# Patient Record
Sex: FEMALE | Race: WHITE | Employment: PART TIME | URBAN - METROPOLITAN AREA
[De-identification: names, ages, dates, MRNs, and addresses within clinical notes are randomized per-mention and may not be internally consistent; named-entity substitution may affect disease eponyms.]

---

## 2019-03-22 ENCOUNTER — HOSPITAL ENCOUNTER (OUTPATIENT)
Age: 49
Discharge: HOME | End: 2019-03-22
Payer: COMMERCIAL

## 2019-03-22 ENCOUNTER — HOSPITAL ENCOUNTER (OUTPATIENT)
Dept: GENERAL RADIOLOGY | Age: 49
Discharge: HOME | End: 2019-03-22
Payer: COMMERCIAL

## 2019-03-22 DIAGNOSIS — M54.6 PAIN IN THORACIC SPINE: ICD-10-CM

## 2019-03-22 LAB
ALBUMIN SERPL-MCNC: 4.6 GM/DL (ref 3.4–5)
ALP BLD-CCNC: 83 IU/L (ref 40–129)
ALT SERPL-CCNC: 22 U/L (ref 10–40)
ANION GAP SERPL CALCULATED.3IONS-SCNC: 13 MMOL/L (ref 4–16)
AST SERPL-CCNC: 15 IU/L (ref 15–37)
BACTERIA: ABNORMAL /HPF
BASOPHILS ABSOLUTE: 0.1 K/CU MM
BASOPHILS RELATIVE PERCENT: 0.4 % (ref 0–1)
BILIRUB SERPL-MCNC: 0.2 MG/DL (ref 0–1)
BILIRUBIN DIRECT: 0.2 MG/DL (ref 0–0.3)
BILIRUBIN URINE: NEGATIVE MG/DL
BILIRUBIN, INDIRECT: 0 MG/DL (ref 0–0.7)
BLOOD, URINE: ABNORMAL
BUN BLDV-MCNC: 9 MG/DL (ref 6–23)
CALCIUM SERPL-MCNC: 9.2 MG/DL (ref 8.3–10.6)
CHLORIDE BLD-SCNC: 103 MMOL/L (ref 99–110)
CHOLESTEROL: 328 MG/DL
CLARITY: CLEAR
CO2: 21 MMOL/L (ref 21–32)
COLOR: YELLOW
CREAT SERPL-MCNC: 0.7 MG/DL (ref 0.6–1.1)
DIFFERENTIAL TYPE: ABNORMAL
EOSINOPHILS ABSOLUTE: 0.3 K/CU MM
EOSINOPHILS RELATIVE PERCENT: 2.2 % (ref 0–3)
ERYTHROCYTE SEDIMENTATION RATE: 16 MM/HR (ref 0–20)
ESTIMATED AVERAGE GLUCOSE: 100 MG/DL
GFR AFRICAN AMERICAN: >60 ML/MIN/1.73M2
GFR NON-AFRICAN AMERICAN: >60 ML/MIN/1.73M2
GLUCOSE BLD-MCNC: 83 MG/DL (ref 70–99)
GLUCOSE, URINE: NEGATIVE MG/DL
HBA1C MFR BLD: 5.1 % (ref 4.2–6.3)
HCT VFR BLD CALC: 48 % (ref 37–47)
HDLC SERPL-MCNC: 29 MG/DL
HEMOGLOBIN: 15.1 GM/DL (ref 12.5–16)
IMMATURE NEUTROPHIL %: 0.3 % (ref 0–0.43)
KETONES, URINE: NEGATIVE MG/DL
LDL CHOLESTEROL DIRECT: 213 MG/DL
LEUKOCYTE ESTERASE, URINE: NEGATIVE
LYMPHOCYTES ABSOLUTE: 4.5 K/CU MM
LYMPHOCYTES RELATIVE PERCENT: 32.7 % (ref 24–44)
MAGNESIUM: 2.1 MG/DL (ref 1.8–2.4)
MCH RBC QN AUTO: 29 PG (ref 27–31)
MCHC RBC AUTO-ENTMCNC: 31.5 % (ref 32–36)
MCV RBC AUTO: 92.3 FL (ref 78–100)
MONOCYTES ABSOLUTE: 0.8 K/CU MM
MONOCYTES RELATIVE PERCENT: 5.4 % (ref 0–4)
MUCUS: ABNORMAL HPF
NITRITE URINE, QUANTITATIVE: NEGATIVE
NUCLEATED RBC %: 0 %
PDW BLD-RTO: 13.5 % (ref 11.7–14.9)
PH, URINE: 5 (ref 5–8)
PLATELET # BLD: 462 K/CU MM (ref 140–440)
PMV BLD AUTO: 9.5 FL (ref 7.5–11.1)
POTASSIUM SERPL-SCNC: 4.7 MMOL/L (ref 3.5–5.1)
PROTEIN UA: NEGATIVE MG/DL
RBC # BLD: 5.2 M/CU MM (ref 4.2–5.4)
RBC URINE: 1 /HPF (ref 0–6)
SEGMENTED NEUTROPHILS ABSOLUTE COUNT: 8.2 K/CU MM
SEGMENTED NEUTROPHILS RELATIVE PERCENT: 59 % (ref 36–66)
SODIUM BLD-SCNC: 137 MMOL/L (ref 135–145)
SPECIFIC GRAVITY UA: 1.02 (ref 1–1.03)
SQUAMOUS EPITHELIAL: 5 /HPF
T4 FREE: 1.08 NG/DL (ref 0.9–1.8)
TOTAL IMMATURE NEUTOROPHIL: 0.04 K/CU MM
TOTAL NUCLEATED RBC: 0 K/CU MM
TOTAL PROTEIN: 7.2 GM/DL (ref 6.4–8.2)
TRICHOMONAS: ABNORMAL /HPF
TRIGL SERPL-MCNC: 737 MG/DL
TSH HIGH SENSITIVITY: 1.62 UIU/ML (ref 0.27–4.2)
URIC ACID: 5.1 MG/DL (ref 2.6–6)
UROBILINOGEN, URINE: NORMAL MG/DL (ref 0.2–1)
VITAMIN D 25-HYDROXY: 13.05 NG/ML
WBC # BLD: 13.8 K/CU MM (ref 4–10.5)
WBC UA: 1 /HPF (ref 0–5)

## 2019-03-22 PROCEDURE — 36415 COLL VENOUS BLD VENIPUNCTURE: CPT

## 2019-03-22 PROCEDURE — 85652 RBC SED RATE AUTOMATED: CPT

## 2019-03-22 PROCEDURE — 84443 ASSAY THYROID STIM HORMONE: CPT

## 2019-03-22 PROCEDURE — 85025 COMPLETE CBC W/AUTO DIFF WBC: CPT

## 2019-03-22 PROCEDURE — 80053 COMPREHEN METABOLIC PANEL: CPT

## 2019-03-22 PROCEDURE — 82306 VITAMIN D 25 HYDROXY: CPT

## 2019-03-22 PROCEDURE — 83721 ASSAY OF BLOOD LIPOPROTEIN: CPT

## 2019-03-22 PROCEDURE — 80061 LIPID PANEL: CPT

## 2019-03-22 PROCEDURE — 82248 BILIRUBIN DIRECT: CPT

## 2019-03-22 PROCEDURE — 83036 HEMOGLOBIN GLYCOSYLATED A1C: CPT

## 2019-03-22 PROCEDURE — 72070 X-RAY EXAM THORAC SPINE 2VWS: CPT

## 2019-03-22 PROCEDURE — 84439 ASSAY OF FREE THYROXINE: CPT

## 2019-03-22 PROCEDURE — 72050 X-RAY EXAM NECK SPINE 4/5VWS: CPT

## 2019-03-22 PROCEDURE — 84550 ASSAY OF BLOOD/URIC ACID: CPT

## 2019-03-22 PROCEDURE — 83735 ASSAY OF MAGNESIUM: CPT

## 2019-03-22 PROCEDURE — 81001 URINALYSIS AUTO W/SCOPE: CPT

## 2020-02-04 ENCOUNTER — HOSPITAL ENCOUNTER (OUTPATIENT)
Dept: PHYSICAL THERAPY | Age: 50
Setting detail: THERAPIES SERIES
Discharge: HOME | End: 2020-02-04
Payer: COMMERCIAL

## 2020-02-04 PROCEDURE — 97110 THERAPEUTIC EXERCISES: CPT

## 2020-02-04 PROCEDURE — 97162 PT EVAL MOD COMPLEX 30 MIN: CPT

## 2020-02-04 PROCEDURE — G0283 ELEC STIM OTHER THAN WOUND: HCPCS

## 2020-02-04 PROCEDURE — 97140 MANUAL THERAPY 1/> REGIONS: CPT

## 2020-02-04 ASSESSMENT — PAIN DESCRIPTION - LOCATION: LOCATION: ARM;HAND;NECK

## 2020-02-04 ASSESSMENT — PAIN DESCRIPTION - PROGRESSION: CLINICAL_PROGRESSION: GRADUALLY WORSENING

## 2020-02-04 ASSESSMENT — PAIN DESCRIPTION - PAIN TYPE: TYPE: CHRONIC PAIN

## 2020-02-04 ASSESSMENT — PAIN - FUNCTIONAL ASSESSMENT: PAIN_FUNCTIONAL_ASSESSMENT: PREVENTS OR INTERFERES WITH MANY ACTIVE NOT PASSIVE ACTIVITIES

## 2020-02-04 ASSESSMENT — PAIN DESCRIPTION - ORIENTATION: ORIENTATION: RIGHT

## 2020-02-04 ASSESSMENT — PAIN SCALES - GENERAL: PAINLEVEL_OUTOF10: 1

## 2020-02-04 ASSESSMENT — PAIN DESCRIPTION - FREQUENCY: FREQUENCY: INTERMITTENT

## 2020-02-04 ASSESSMENT — PAIN DESCRIPTION - DESCRIPTORS: DESCRIPTORS: ACHING;SHARP

## 2020-02-04 ASSESSMENT — PAIN DESCRIPTION - ONSET: ONSET: PROGRESSIVE

## 2020-02-04 NOTE — FLOWSHEET NOTE
tissue irritability and pain and improve tolerance to usual activity. Pt has had gradually worsening pain for last 1-2 years especiallly, driving more painful in that time. Subjective:  See eval         Any changes in Ambulatory Summary Sheet? None        Objective:  See eval           Exercises: (No more than 4 columns)   Exercise/Equipment 2/4/20 Date Date           WARM UP            MANUAL:  As below x                    TABLE      Thor rot stretch  5x5-10\" ea, bent elbow prn     Hold head off pillow in S/L 3X5\" ea side      cerv retract  Next      DNF  Seated 3x3\"      Prone scap squeeze Painful R UE                          STANDING      scap squeezes back to wall and arms down  10x, cues for no shrug                                                                            MODALITIES IFC                     Other Therapeutic Activities/Education:  Patient received education on their current pathology and how their condition effects them with their functional activities. Patient understood discussion and questions were answered. Patient understands their activity limitations and understands rational for treatment progression. Home Exercise Program:  Issued, practiced and pt demo ability to perform 2/4/2020         Manual Treatments:  Prone Thor mobs to sis and eval cerv retractions/mobs      Modalities:  IFC to C/T spine w/ pt seated and cervical HP on R and L draped over T spine. 13'        Communication with other providers:  POC set for cosign 2/4/20      Assessment: Assessment: Pt is a 50 yo female who presents w/ neck and upper back pain and R hand symptoms. She demonstrates decreased endurance of cervical and thoracic muscles w/ altered alignment and soft tissue restrictions in thoracic spine especially. She has high tissue irritability R >L thoracid region and decreased tolerance to supine position.   These limitations are affecting her ability to perform her usual daily activity and she will benefit from PT to help restore alignment, decrease soft tissue irritability and pain and improve tolerance to usual activity. Pt has had gradually worsening pain for last 1-2 years especiallly, driving more painful in that time.        Plan for Next Session:   trial some manual cervical traction, work on thoracic mobility to tolerance, soft tissue prn, check on HEP and adjust prn, advance strength work slowly and work on endurance of cerv and thor regions, pain control prn      Time In / Time Out: 1005/1120            Timed Code/Total Treatment Minutes:   20/76   1 man 10', 1 TE 15', 1 Estim 15', 1 eval 35      Next Progress Note due:  Visit 10      Plan of Care Interventions:  [x] Therapeutic Exercise  [x] Modalities:  [x] Therapeutic Activity     [] Ultrasound  [x] Estim  [] Gait Training      [] Cervical Traction [] Lumbar Traction  [x] Neuromuscular Re-education    [x] Cold/hotpack [] Iontophoresis   [x] Instruction in HEP      [] Vasopneumatic   [] Dry Needling    [x] Manual Therapy               [] Aquatic Therapy              Electronically signed by:  Clarita Milian, PT, MPT, ATC  2/4/2020, 11:41 AM    2/4/2020, 11:42 AM

## 2020-02-04 NOTE — PLAN OF CARE
Outpatient Physical Therapy           [place]           [x] Phone: [phone]   Fax: [phone]  [place]           [] Phone: [phone]   Fax: [phone]     To: Referring Practitioner: Graciella Libman    From: Clarita Milian PT     Patient: Rae Lyons       : 1970  Diagnosis: Diagnosis: Cervical thoracic pain   Treatment Diagnosis: Treatment Diagnosis: neck and thoracic pain, weakness, stiffness   Date: 2020    Physical Therapy Certification/Re-Certification Form  Dear Dr. Graciella Libman,   The following patient has been evaluated for physical therapy services and for therapy to continue, insurance requires physician review of the treatment plan initially and every 90 days. Please review the attached evaluation and/or summary of the patient's plan of care, and verify that you agree therapy should continue by signing the attached document and sending it back to our office. Assessment:    Assessment: Pt is a 52 yo female who presents w/ neck and upper back pain and R hand symptoms. She demonstrates decreased endurance of cervical and thoracic muscles w/ altered alignment and soft tissue restrictions in thoracic spine especially. She has high tissue irritability R >L thoracid region and decreased tolerance to supine position. These limitations are affecting her ability to perform her usual daily activity and she will benefit from PT to help restore alignment, decrease soft tissue irritability and pain and improve tolerance to usual activity. Pt has had gradually worsening pain for last 1-2 years especiallly, driving more painful in that time. Patient agrees with established plan of care and assisted in the development of their short term and long term goals. Patient had no adverse reaction with initial treatment and there are no barriers to learning. Demonstrates no mental or cognitive disorder.       Plan of Care/Treatment to date:  [x] Therapeutic Exercise  [x] Modalities:  [x]

## 2020-02-04 NOTE — PROGRESS NOTES
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 Observation: min rounded shoulders, in supine lower ribs seem rotated to L, in prone T spine rotated w/ TP's noted elevated on R    Spine  Cervical: rot R 70, L 68, flex chin to chest pulls in Tspine, ext 58, SB R 35, L 40 ---mild pain w/ rot adn SB to L    Strength RUE  Strength RUE: WFL  Strength LUE  Strength LUE: WFL     Additional Measures  Special Tests: Neg cerv compression/distraction and Spurling. Segmental mobility testinng min hypomobility t/o C and T spine more pain T spine, holding head off pillow 15\" increases symptoms, Cerv retraction supine no change symptoms   Sensation  Overall Sensation Status: WNL        Assessment   Conditions Requiring Skilled Therapeutic Intervention  Body structures, Functions, Activity limitations: Decreased functional mobility ; Decreased ADL status; Decreased ROM; Decreased strength; Increased pain;Decreased high-level IADLs;Decreased posture  Assessment: Pt is a 52 yo female who presents w/ neck and upper back pain and R hand symptoms. She demonstrates decreased endurance of cervical and thoracic muscles w/ altered alignment and soft tissue restrictions in thoracic spine especially. She has high tissue irritability R >L thoracid region and decreased tolerance to supine position. These limitations are affecting her ability to perform her usual daily activity and she will benefit from PT to help restore alignment, decrease soft tissue irritability and pain and improve tolerance to usual activity. Pt has had gradually worsening pain for last 1-2 years especiallly, driving more painful in that time. Patient agrees with established plan of care and assisted in the development of their short term and long term goals.  Patient had no

## 2020-02-06 ENCOUNTER — HOSPITAL ENCOUNTER (OUTPATIENT)
Dept: PHYSICAL THERAPY | Age: 50
Setting detail: THERAPIES SERIES
Discharge: HOME | End: 2020-02-06
Payer: COMMERCIAL

## 2020-02-06 PROCEDURE — 97112 NEUROMUSCULAR REEDUCATION: CPT

## 2020-02-06 PROCEDURE — G0283 ELEC STIM OTHER THAN WOUND: HCPCS

## 2020-02-06 PROCEDURE — 97140 MANUAL THERAPY 1/> REGIONS: CPT

## 2020-02-06 NOTE — FLOWSHEET NOTE
Outpatient Physical Therapy  [place]           [x] Phone: [phone]   Fax: [phone]  [place]           [] Phone: [phone]   Fax: [phone]        Physical Therapy Daily Treatment Note  Date:  2020    Patient Name:  Jg Dunn    :  1970  MRN: [id number]  Restrictions/Precautions: Other position/activity restrictions: none  Diagnosis:   Diagnosis: Cervical thoracic pain  Date of Injury/Surgery:   Treatment Diagnosis: Treatment Diagnosis: neck and thoracic pain, weakness, stiffness    Insurance/Certification information: [facility]   Referring Physician:  Referring Practitioner: Mila López  Next Doctor Visit:    Plan of care signed (Y/N):  Y  Outcome Measure: NDI 16%  Visit# / total visits:    POC  Pain level: 6/10 R UT pain       Goals:       Short term goals  Time Frame for Short term goals: 3 weeks, 20  Short term goal 1: Pt will be able to report at least 25% reduction in pain   Short term goal 2: Pt will be able to tolerate slowly advancing Cerv and thor ROM and strengthening   Short term goal 3: Pt will be compliant w/ HEP   Long term goals  Time Frame for Long term goals : 6 weeks, 3/13/20  Long term goal 1: Pt will be independent w/ HEP and able to continue to manage her pain on her own after PT  Long term goal 2: Pt will be able to report pain < 5/10 at least 50% of the time  Long term goal 3: Pt will be able to drive w/ min pain    Summary of Evaluation: Assessment: Pt is a 52 yo female who presents w/ neck and upper back pain and R hand symptoms. She demonstrates decreased endurance of cervical and thoracic muscles w/ altered alignment and soft tissue restrictions in thoracic spine especially. She has high tissue irritability R >L thoracid region and decreased tolerance to supine position.   These limitations are affecting her ability to perform her usual daily activity and she will benefit from PT to help restore alignment, decrease soft tissue irritability and pain and improve tolerance to usual activity. Pt has had gradually worsening pain for last 1-2 years especiallly, driving more painful in that time. Subjective:  Yoel Ivy arrives to therapy stating that the neck is sore and stiff this morning, cracking a lot. UT muscle is painful on the R clear up into the base of her skull. Between the shoulder blades feels like a shocking feeling. Had a headache after last session and was sore, felt like she had been worked. Any changes in Ambulatory Summary Sheet? None        Objective: Forward head, rounded shoulders, kyphotic thoracic spine. Multiple cues for relaxed shoulder posture. Heavy use of R UT with scapular retraction with elbows bent. Large TP in R UT. 3 cavitations with rotational mobilizations this date. Exercises: (No more than 4 columns)   Exercise/Equipment 2/4/20 2/6/2020 Date           WARM UP            MANUAL:  As below x x                   TABLE      Thor rot stretch  5x5-10\" ea, bent elbow prn 5*5\" ea     Hold head off pillow in S/L 3X5\" ea side  3*5\" ea     cerv retract  Next  10*3\"    DNF  Seated 3x3\"  Seated 10*3\"    Prone scap squeeze Painful R UE -                         STANDING      scap squeezes back to wall and arms down  10x, cues for no shrug 10* ea                                                                            MODALITIES IFC IFC, MHP                    Other Therapeutic Activities/Education: none         Home Exercise Program:  Issued, practiced and pt demo ability to perform 2/4/2020         Manual Treatments:  Prone thoracic mobilizations P-A with SP and TVP contact, rotational mobilizations as well. STM/TPR to R UT muscle and manual stretching here as well. Modalities:  IFC to C/T spine w/ pt seated and cervical HP on R and L draped over T spine.   13'        Communication with other providers:  POC set for cosign 2/4/20      Assessment:  Yoel Ivy tolerated today's

## 2020-02-14 ENCOUNTER — HOSPITAL ENCOUNTER (OUTPATIENT)
Dept: PHYSICAL THERAPY | Age: 50
Setting detail: THERAPIES SERIES
Discharge: HOME | End: 2020-02-14
Payer: COMMERCIAL

## 2020-02-14 PROCEDURE — 97140 MANUAL THERAPY 1/> REGIONS: CPT

## 2020-02-14 PROCEDURE — 97112 NEUROMUSCULAR REEDUCATION: CPT

## 2020-02-14 PROCEDURE — G0283 ELEC STIM OTHER THAN WOUND: HCPCS

## 2020-02-14 NOTE — FLOWSHEET NOTE
Outpatient Physical Therapy  [place]           [x] Phone: [phone]   Fax: [phone]  [place]           [] Phone: [phone]   Fax: [phone]        Physical Therapy Daily Treatment Note  Date:  2020    Patient Name:  Kimberly Sharma    :  1970  MRN: [id number]  Restrictions/Precautions: Other position/activity restrictions: none  Diagnosis:   Diagnosis: Cervical thoracic pain  Date of Injury/Surgery:   Treatment Diagnosis: Treatment Diagnosis: neck and thoracic pain, weakness, stiffness    Insurance/Certification information: PT Insurance Information: [facility]   Referring Physician:  Referring Practitioner: Angel Hi Doctor Visit:    Plan of care signed (Y/N):  Y  Outcome Measure: NDI 16%  Visit# / total visits:   3/12 POC  Pain level: 1/10        Goals:       Short term goals  Time Frame for Short term goals: 3 weeks, 20  Short term goal 1: Pt will be able to report at least 25% reduction in pain   Short term goal 2: Pt will be able to tolerate slowly advancing Cerv and thor ROM and strengthening   Short term goal 3: Pt will be compliant w/ HEP   Long term goals  Time Frame for Long term goals : 6 weeks, 3/13/20  Long term goal 1: Pt will be independent w/ HEP and able to continue to manage her pain on her own after PT  Long term goal 2: Pt will be able to report pain < 5/10 at least 50% of the time  Long term goal 3: Pt will be able to drive w/ min pain    Summary of Evaluation: Assessment: Pt is a 52 yo female who presents w/ neck and upper back pain and R hand symptoms. She demonstrates decreased endurance of cervical and thoracic muscles w/ altered alignment and soft tissue restrictions in thoracic spine especially. She has high tissue irritability R >L thoracid region and decreased tolerance to supine position.   These limitations are affecting her ability to perform her usual daily activity and she will benefit from PT to help restore alignment, decrease soft tissue irritability and pain and improve tolerance to usual activity. Pt has had gradually worsening pain for last 1-2 years especiallly, driving more painful in that time. Subjective:  Pt stated that her pain today was 1/10,but patient stated that she took muscle relaxer last night. Pt denied any headache today. Pt stated that she was having more pain in her low back today. Any changes in Ambulatory Summary Sheet? None        Objective:  Noted multiple TPs in scalenes, UTs, and cervical paraspinals. Pt had difficulty laying supine today due to report of increased low back pain. Pt also initially very guarded with cervical ROM. Exercises: (No more than 4 columns)   Exercise/Equipment 2/4/20 2/6/2020 2/14/2020           WARM UP            MANUAL:  As below x x x                  TABLE      Thor rot stretch  5x5-10\" ea, bent elbow prn 5*5\" ea  5x 5\"    Hold head off pillow in S/L 3X5\" ea side  3*5\" ea  5x 5\" ea    cerv retract  Next  10*3\" 10x2   DNF  Seated 3x3\"  Seated 10*3\" Supine x 5   Prone scap squeeze Painful R UE -                         STANDING      scap squeezes back to wall and arms down  10x, cues for no shrug 10* ea  --                                                                          MODALITIES IFC IFC, MHP IFC, HP                   Other Therapeutic Activities/Education: none         Home Exercise Program:  Issued, practiced and pt demo ability to perform 2/4/2020         Manual Treatments: rotational mobilizations  STM/TPR to B UT, scalenes, and cervical paraspinals muscle and manual stretching and PROM in all directions x 25'      Modalities:  IFC to C/T spine w/ pt seated and cervical HP on R and L draped over T spine. 13'        Communication with other providers:  POC set for cosign 2/4/20      Assessment:  Pt tolerated treatment fairly well. Pt was able to add supine DNF. Pt needed cues to hold the chin tuck and perform the DNF.  Pt rated pain at 1/10 ,but stated that she still felt like the electrodes were on her back.      End session pain: 1/10      Plan for Next Session:   trial some manual cervical traction, work on thoracic mobility to tolerance, soft tissue prn, check on HEP and adjust prn, advance strength work slowly and work on endurance of cerv and thor regions, pain control prn          Time In / Time Out: 8258/0368        Timed Code/Total Treatment Minutes:   45'/60' 1 NR 20' 2 man 25' 1 estim 15'       Next Progress Note due:  Visit 10      Plan of Care Interventions:  [x] Therapeutic Exercise  [x] Modalities:  [x] Therapeutic Activity     [] Ultrasound  [x] Estim  [] Gait Training      [] Cervical Traction [] Lumbar Traction  [x] Neuromuscular Re-education    [x] Cold/hotpack [] Iontophoresis   [x] Instruction in HEP      [] Vasopneumatic   [] Dry Needling    [x] Manual Therapy               [] Aquatic Therapy              Electronically signed by:  David Todd PTA  2/14/2020, 8:41 AM        2/14/2020,11:13 AM

## 2020-02-19 ENCOUNTER — HOSPITAL ENCOUNTER (OUTPATIENT)
Dept: PHYSICAL THERAPY | Age: 50
Setting detail: THERAPIES SERIES
Discharge: HOME | End: 2020-02-19
Payer: COMMERCIAL

## 2020-02-19 PROCEDURE — 97110 THERAPEUTIC EXERCISES: CPT

## 2020-02-19 PROCEDURE — 97140 MANUAL THERAPY 1/> REGIONS: CPT

## 2020-02-19 PROCEDURE — G0283 ELEC STIM OTHER THAN WOUND: HCPCS

## 2020-02-19 NOTE — FLOWSHEET NOTE
Outpatient Physical Therapy  [place]           [x] Phone: [phone]   Fax: [phone]  [place]           [] Phone: [phone]   Fax: [phone]        Physical Therapy Daily Treatment Note  Date:  2020    Patient Name:  Christina Prather    :  1970  MRN: [id number]  Restrictions/Precautions: Other position/activity restrictions: none  Diagnosis:   Diagnosis: Cervical thoracic pain  Date of Injury/Surgery:   Treatment Diagnosis: Treatment Diagnosis: neck and thoracic pain, weakness, stiffness    Insurance/Certification information: PT [facility]   Referring Physician:  Referring Practitioner: Jian Ash  Next Doctor Visit:    Plan of care signed (Y/N):  Y  Outcome Measure: NDI 16%  Visit# / total visits:    POC  Pain level: 1/10        Goals:       Short term goals  Time Frame for Short term goals: 3 weeks, 20  Short term goal 1: Pt will be able to report at least 25% reduction in pain   Short term goal 2: Pt will be able to tolerate slowly advancing Cerv and thor ROM and strengthening   Short term goal 3: Pt will be compliant w/ HEP   Long term goals  Time Frame for Long term goals : 6 weeks, 3/13/20  Long term goal 1: Pt will be independent w/ HEP and able to continue to manage her pain on her own after PT  Long term goal 2: Pt will be able to report pain < 5/10 at least 50% of the time  Long term goal 3: Pt will be able to drive w/ min pain    Summary of Evaluation: Assessment: Pt is a 52 yo female who presents w/ neck and upper back pain and R hand symptoms. She demonstrates decreased endurance of cervical and thoracic muscles w/ altered alignment and soft tissue restrictions in thoracic spine especially. She has high tissue irritability R >L thoracid region and decreased tolerance to supine position.   These limitations are affecting her ability to perform her usual daily activity and she will benefit from PT to help restore alignment, decrease soft tissue irritability and pain and improve tolerance to usual activity. Pt has had gradually worsening pain for last 1-2 years especiallly, driving more painful in that time. Subjective:  Pt stated she is doing pretty good today. Any changes in Ambulatory Summary Sheet? None        Objective:  Significant TP's in the B UT/medial scap borders        Exercises: (No more than 4 columns)   Exercise/Equipment 2/4/20 2/6/2020 2/14/2020 2/19/20 #4            WARM UP              MANUAL:  As below x x x                     TABLE       Thor rot stretch  5x5-10\" ea, bent elbow prn 5*5\" ea  5x 5\"     Hold head off pillow in S/L 3X5\" ea side  3*5\" ea  5x 5\" ea     cerv retract  Next  10*3\" 10x2    DNF  Seated 3x3\"  Seated 10*3\" Supine x 5 Supine x 5 w/ 3 ct   Prone scap squeeze Painful R UE -                             STANDING       scap squeezes back to wall and arms down  10x, cues for no shrug 10* ea  -- 5 ct x 10   TRX walkouts    5 ct x 10                                                                              MODALITIES IFC IFC, MHP IFC, HP IFC to the B UT/medial scap borders with HP's over both shldrs x 15 min. See below                     Other Therapeutic Activities/Education: none         Home Exercise Program:  Issued, practiced and pt demo ability to perform 2/4/2020         Manual Treatments:  STM/TPR to B UT, scalenes, and cervical paraspinals muscle and manual stretching and PROM in all directions x 25'      Modalities:  IFC to C/T spine w/ pt seated and cervical HP on R and L draped over T spine.   13'        Communication with other providers:  POC set for cosign 2/4/20      Assessment:  Pain post treatment 2/10      Plan for Next Session:   trial some manual cervical traction, work on thoracic mobility to tolerance, soft tissue prn, check on HEP and adjust prn, advance strength work slowly and work on endurance of cerv and thor regions, pain control prn          Time In / Time Out: 987-542        Timed Code/Total Treatment Minutes:   22' TE, 25' manual,15' ES/ 58'      Next Progress Note due:  Visit 10      Plan of Care Interventions:  [x] Therapeutic Exercise  [x] Modalities:  [x] Therapeutic Activity     [] Ultrasound  [x] Estim  [] Gait Training      [] Cervical Traction [] Lumbar Traction  [x] Neuromuscular Re-education    [x] Cold/hotpack [] Iontophoresis   [x] Instruction in HEP      [] Vasopneumatic   [] Dry Needling    [x] Manual Therapy               [] Aquatic Therapy              Electronically signed by:  Radhika Caal PTA  2/19/2020, 8:47 AM

## 2020-02-21 ENCOUNTER — HOSPITAL ENCOUNTER (OUTPATIENT)
Dept: PHYSICAL THERAPY | Age: 50
Setting detail: THERAPIES SERIES
Discharge: HOME | End: 2020-02-21
Payer: COMMERCIAL

## 2020-02-21 PROCEDURE — G0283 ELEC STIM OTHER THAN WOUND: HCPCS

## 2020-02-21 PROCEDURE — 97140 MANUAL THERAPY 1/> REGIONS: CPT

## 2020-02-21 PROCEDURE — 97110 THERAPEUTIC EXERCISES: CPT

## 2020-02-25 ENCOUNTER — HOSPITAL ENCOUNTER (OUTPATIENT)
Dept: PHYSICAL THERAPY | Age: 50
Setting detail: THERAPIES SERIES
Discharge: HOME | End: 2020-02-25
Payer: COMMERCIAL

## 2020-02-25 PROCEDURE — 97140 MANUAL THERAPY 1/> REGIONS: CPT

## 2020-02-25 PROCEDURE — 97110 THERAPEUTIC EXERCISES: CPT

## 2020-02-25 PROCEDURE — G0283 ELEC STIM OTHER THAN WOUND: HCPCS

## 2020-02-25 NOTE — FLOWSHEET NOTE
tissue irritability and pain and improve tolerance to usual activity. Pt has had gradually worsening pain for last 1-2 years especiallly, driving more painful in that time. Subjective:  Pt states she has had a rough time with her mother in law this weekend. Any changes in Ambulatory Summary Sheet? None        Objective:  Significant TP in the medial L scap border. Both UT have TP's. Cervical ROM seems to be improving with more ROM noted with the manual stretching. Exercises: (No more than 4 columns)   Exercise/Equipment 2/14/2020 2/19/20 #4 2/21/20 #5 2/25/20 #6            WARM UP   UBE 3'/3' UBE 3'/3'          MANUAL:  As below x                       TABLE       Thor rot stretch  5x 5\"       Hold head off pillow in S/L 5x 5\" ea       cerv retract  10x2      DNF  Supine x 5 Supine x 5 w/ 3 ct     Prone scap squeeze                               STANDING       scap squeezes back to wall and arms down  -- 5 ct x 10     TRX walkouts  5 ct x 10 5 ct x 10 5 ct x 15   TB row   3 ct 2 x 10 RTB 3 ct 3 x 10 RTB                                                                       MODALITIES IFC, HP IFC to the B UT/medial scap borders with HP's over both shldrs x 15 min. See below IFC to the B UT/medial scap borders with HP's over both shldrs x 15 min. See below IFC to the B UT/medial scap borders with HP's over both shldrs x 15 min. See below                     Other Therapeutic Activities/Education: none         Home Exercise Program:  Issued, practiced and pt demo ability to perform 2/4/2020         Manual Treatments:  STM/TPR to B UT, scalenes, and cervical paraspinals muscle and manual stretching of rotation, SB and levator x 30'      Modalities:  IFC to C/T spine w/ pt seated and cervical HP on R and L draped over T spine. 13'        Communication with other providers:  POC set for cosign 2/4/20      Assessment:  Pain post ES/HP 2/10.     Plan for Next Session:   trial some manual cervical

## 2020-02-28 ENCOUNTER — HOSPITAL ENCOUNTER (OUTPATIENT)
Dept: PHYSICAL THERAPY | Age: 50
Setting detail: THERAPIES SERIES
Discharge: HOME | End: 2020-02-28
Payer: COMMERCIAL

## 2020-02-28 PROCEDURE — 97112 NEUROMUSCULAR REEDUCATION: CPT

## 2020-02-28 PROCEDURE — 97110 THERAPEUTIC EXERCISES: CPT

## 2020-02-28 PROCEDURE — G0283 ELEC STIM OTHER THAN WOUND: HCPCS

## 2020-02-28 PROCEDURE — 97140 MANUAL THERAPY 1/> REGIONS: CPT

## 2020-02-28 NOTE — FLOWSHEET NOTE
help restore alignment, decrease soft tissue irritability and pain and improve tolerance to usual activity. Pt has had gradually worsening pain for last 1-2 years especiallly, driving more painful in that time. Subjective:   Pt feels like PT is helping, overall feeling better. Max pain in last 2 weeks <5/10. Pt still feels weak also. Manual work is helping too. Any changes in Ambulatory Summary Sheet? None        Objective:   Pt has a very hard time holding posture upright and adding arm movements, pain in scap region. Noted core weakness as well. Pt very TTP L > R thoracic region w/ significant hypomobility of T spine. Min change in any symptoms w/ cervical traction. Please see plan for next session below, thanks!! Exercises: (No more than 4 columns)   Exercise/Equipment 2/21/20 #5 2/25/20 #6 2/28/20  #7             WARM UP UBE 3'/3' UBE 3'/3' 100 rpm, 2' ea            MANUAL:  As below   x                     TABLE       Thor rot stretch    5x10\" ea     cerv retract    -    DNF    standing    Supine on half roll, shoulder flex   10x ea UE, hold 2-3\"    Prone scap squeezes    10x3\"                     STANDING       scap squeezes - row  arms down    RTB 10x2   RTB 10x2    TRX walkouts 5 ct x 10 5 ct x 15 -    TB row 3 ct 2 x 10 RTB 3 ct 3 x 10 RTB -    Shoulder flex w/ back to wall, hold towel behind head   - Too painful, did just good posture, flex to 90 10x    Deep neck flex at wall, towel behind head   10x3\"    Wall push up   10x2                                                   MODALITIES IFC to the B UT/medial scap borders with HP's over both shldrs x 15 min. See below IFC to the B UT/medial scap borders with HP's over both shldrs x 15 min.   See below IFC as below                      Other Therapeutic Activities/Education: none         Home Exercise Program:  Issued, practiced and pt demo ability to perform 2/4/2020         Manual Treatments:   Manual cervical traction 30x60\",

## 2020-03-03 ENCOUNTER — HOSPITAL ENCOUNTER (OUTPATIENT)
Dept: PHYSICAL THERAPY | Age: 50
Setting detail: THERAPIES SERIES
Discharge: HOME | End: 2020-03-03
Payer: COMMERCIAL

## 2020-03-03 PROCEDURE — 97140 MANUAL THERAPY 1/> REGIONS: CPT

## 2020-03-03 PROCEDURE — 97530 THERAPEUTIC ACTIVITIES: CPT

## 2020-03-03 PROCEDURE — G0283 ELEC STIM OTHER THAN WOUND: HCPCS

## 2020-03-03 PROCEDURE — 97110 THERAPEUTIC EXERCISES: CPT

## 2020-03-03 NOTE — FLOWSHEET NOTE
Outpatient Physical Therapy  [place]           [x] Phone: [phone]   Fax: [phone]  [place]           [] Phone: [phone]   Fax: [phone]        Physical Therapy Daily Treatment Note  Date:  3/3/2020    Patient Name:  Miranda Hobbs    :  1970  MRN: [id number]  Restrictions/Precautions: Other position/activity restrictions: none  Diagnosis:   Diagnosis: Cervical thoracic pain  Date of Injury/Surgery:   Treatment Diagnosis: Treatment Diagnosis: neck and thoracic pain, weakness, stiffness    Insurance/Certification information: PT Insurance Information: [facility]   Referring Physician:  Referring Practitioner: Helen Aguirre  Next Doctor Visit:    Plan of care signed (Y/N):  Y  Outcome Measure: NDI 16%  Visit# / total visits:    POC  Pain level: 2-3/10        Goals:       Short term goals  Time Frame for Short term goals: 3 weeks, 20  Short term goal 1: Pt will be able to report at least 25% reduction in pain   Met   Short term goal 2: Pt will be able to tolerate slowly advancing Cerv and thor ROM and strengthening Partially met  Short term goal 3: Pt will be compliant w/ HEP   Partially met    Long term goals  Time Frame for Long term goals : 6 weeks, 3/13/20  Long term goal 1: Pt will be independent w/ HEP and able to continue to manage her pain on her own after PT  Long term goal 2: Pt will be able to report pain < 5/10 at least 50% of the time  Long term goal 3: Pt will be able to drive w/ min pain    Summary of Evaluation: Assessment: Pt is a 52 yo female who presents w/ neck and upper back pain and R hand symptoms. She demonstrates decreased endurance of cervical and thoracic muscles w/ altered alignment and soft tissue restrictions in thoracic spine especially. She has high tissue irritability R >L thoracid region and decreased tolerance to supine position.   These limitations are affecting her ability to perform her usual daily activity and she will benefit from PT to ability to perform 2/4/2020         Manual Treatments:   Manual cervical traction and UT/ levator stretching 30x60\",  Prone thor mobs P/A , very gentle d/t pain, Correction of the R T 5-6 depressed rib with pressure to the L ,total time 15'      Modalities:  IFC to C/T spine w/ pt seated and cervical CP on R and L draped over T spine.   13'        Communication with other providers:  POC set for cosign 2/4/20      Assessment:  Reduction of pain to 1/10 post treatment    Plan for Next Session:   Assess response to manual cervical traction, work on thoracic mobility to tolerance, soft tissue prn, advance strength work slowly and work on endurance of cerv and thor regions, pain control prn      Time In / Time Out:  850- 1000         Timed Code/Total Treatment Minutes:   13' manual, 15' ES,15' TE, 25' TE/70'              Next Progress Note due:  Visit 10--ST do on 3/6      Plan of Care Interventions:  [x] Therapeutic Exercise  [x] Modalities:  [x] Therapeutic Activity     [] Ultrasound  [x] Estim  [] Gait Training      [] Cervical Traction [] Lumbar Traction  [x] Neuromuscular Re-education    [x] Cold/hotpack [] Iontophoresis   [x] Instruction in HEP      [] Vasopneumatic   [] Dry Needling    [x] Manual Therapy               [] Aquatic Therapy              Electronically signed by:  Shweta Shirley PT, MPT, ATC 3/3/2020, 8:54 AM

## 2020-03-06 ENCOUNTER — HOSPITAL ENCOUNTER (OUTPATIENT)
Dept: PHYSICAL THERAPY | Age: 50
Setting detail: THERAPIES SERIES
Discharge: HOME | End: 2020-03-06
Payer: COMMERCIAL

## 2020-03-06 PROCEDURE — 97140 MANUAL THERAPY 1/> REGIONS: CPT

## 2020-03-06 PROCEDURE — 97110 THERAPEUTIC EXERCISES: CPT

## 2020-03-06 PROCEDURE — 97112 NEUROMUSCULAR REEDUCATION: CPT

## 2020-03-06 PROCEDURE — G0283 ELEC STIM OTHER THAN WOUND: HCPCS

## 2020-03-06 NOTE — FLOWSHEET NOTE
Outpatient Physical Therapy  [place]           [x] Phone: [phone]   Fax: [phone]  [place]           [] Phone: [phone]   Fax: [phone]        Physical Therapy Daily Treatment Note  Date:  3/6/2020    Patient Name:  Elli Pederson    :  1970  MRN: [id number]  Restrictions/Precautions: Other position/activity restrictions: none  Diagnosis:   Diagnosis: Cervical thoracic pain  Date of Injury/Surgery:   Treatment Diagnosis: Treatment Diagnosis: neck and thoracic pain, weakness, stiffness    Insurance/Certification information: PT Insurance Information: [employer]   Referring Physician:  Referring Practitioner: Michelle Soto  Next Doctor Visit:    Plan of care signed (Y/N):  Y, pending cosign PN for 8 more  Outcome Measure: NDI 16%, 3/6: 30%   Visit# / total visits:    POC  Pain level: 2/10        Goals:       Short term goals  Extend all goals to 4/3/20  Time Frame for Short term goals: 3 weeks, 20  Short term goal 1: Pt will be able to report at least 25% reduction in pain   Met   Short term goal 2: Pt will be able to tolerate slowly advancing Cerv and thor ROM and strengthening Mostly met  Short term goal 3: Pt will be compliant w/ HEP   Partially met--hard to find time to do them     Long term goals  Time Frame for Long term goals : 6 weeks, 3/13/20  Long term goal 1: Pt will be independent w/ HEP and able to continue to manage her pain on her own after PT   Partially met  Long term goal 2: Pt will be able to report pain < 5/10 at least 50% of the time  Met 75% of time  Long term goal 3: Pt will be able to drive w/ min pain  Partially met, still some pain  NDI 16%, 3/6: 30%     Summary of Evaluation: Assessment: Pt is a 52 yo female who presents w/ neck and upper back pain and R hand symptoms. She demonstrates decreased endurance of cervical and thoracic muscles w/ altered alignment and soft tissue restrictions in thoracic spine especially.   She has high tissue row  arms down  RTB 10x2   RTB 10x2 RTB 10x2   RTB 10x2 both with 3 ct hold RTB 10x2   RTB 10x2    TRX walkouts -  -    Shoulder flex w/ back to wall, hold towel behind head  Too painful, did just good posture, flex to 90 10x  10x2    Deep neck flex at wall, towel behind head 10x3\" Next visit 15x2\"    Wall push up 10x2 Next visit 10x2, counter                                                    MODALITIES IFC as below IFC as below IFC                       Other Therapeutic Activities/Education: 3/3/20:  Education on transfer training of her mother in law. Worked on rolling a person in bed and sit to stands from chair with guarding her own back and using proper body mechanics x 15 min      Home Exercise Program:  Issued, practiced and pt demo ability to perform 2/4/2020         Manual Treatments:   Manual cervical traction and 3x60\", cervical mobs and PROM to help R SB,  Prone thor mobs P/A and rotational to sis,   total time 15'      Modalities:  IFC to C/T spine w/ pt seated and cervical CP on R and L draped over T spine. 13'        Communication with other providers:  POC set for cosign 2/4/20, see PN 3/6/20      Assessment:  Judith Oliver has been seen for 9 sessions of PT now focusing on ROM, soft tissue mobility, thoracic and cervical mobility, pain reduction and light strengthening. She is making some good progress towards goals but continues w/ some pain and weakness and would benefit from continued PT to help improve strength and mechanics to control pain w/ usual activity.  Reduction of pain to 1/10 post treatment      Plan for Next Session:   continue manual cervical traction prn, continue work on thoracic mobility to tolerance, soft tissue prn, advance strength work slowly and work on endurance of cerv and thor regions, pain control prn      Time In / Time Out:  0915/1020         Timed Code/Total Treatment Minutes:   50/65   1 Man 15', 1 neuro 10', 1 TE 25', 1 estim 15'            Next Progress Note due: see

## 2020-03-06 NOTE — PROGRESS NOTES
Outpatient Physical Therapy           [place]           [x] Phone: [phone]   Fax: [phone]  [place] [place]           [] Phone: [phone]   Fax: [phone]      To: Sumeet       From: Kehinde Turner, PT     Patient: Juliana Sue                  : 1970  Diagnosis: Cervical thoracic pain    Date: 3/6/2020  Treatment Diagnosis: neck and thoracic pain, weakness, stiffness      [x]  Progress Note                []  Discharge Note    Evaluation Date:  2020  Total Visits to date:   9 Cancels/No-shows to date:  0    Subjective:  Arms are hurting more today d/t changing some lifting mech of MIL at home and also just trying to steady her when she's standing. Has hired some help too for [employer]. Pt feels like she is having less pain between shoulder blades, maybe about 50% towards her end goals, including less pain w/ usual activity. Max pain is still 8/10 w/ activity at home. Plan of Care/Treatment to date:  [x] Therapeutic Exercise    [x] Modalities:  [x] Therapeutic Activity     [] Ultrasound  [x] Electrical Stimulation  [] Gait Training      [] Cervical Traction   [] Lumbar Traction  [x] Neuromuscular Re-education  [x] Cold/hotpack [] Iontophoresis  [x] Instruction in HEP      Other:  [x] Manual Therapy       []  Vasopneumatic  [] Aquatic Therapy       []                          Objective/Significant Findings At Last Visit/Comments:  Posture has improved some but pt still w/ protracted scap and rounded T spine. UT compensation still noted also but improving from eval as well. Full shoulder AROM seated w/o pain, Cervical rot R 70. L 78, ext 65 w/ just mild T spine pain, flex WNL, SB R 20 w/o pain, L 40. Pt has improved mobility of thoracic segments w/ less pain but still some trigger points on R scap and along spine. Pt has a C5 not side gliding well today but corrects to manual work.       Assessment:   Fallon Bates has been seen for 9 sessions of PT now focusing on ROM, soft any questions or concerns, please don't hesitate to call.   Thank you for your referral.    Physician Signature:______________________ Date:______ Time: ________  By signing above, therapists plan is approved by physician

## 2020-03-10 ENCOUNTER — HOSPITAL ENCOUNTER (OUTPATIENT)
Dept: PHYSICAL THERAPY | Age: 50
Setting detail: THERAPIES SERIES
Discharge: HOME | End: 2020-03-10
Payer: COMMERCIAL

## 2020-03-10 PROCEDURE — 97110 THERAPEUTIC EXERCISES: CPT

## 2020-03-10 PROCEDURE — 97140 MANUAL THERAPY 1/> REGIONS: CPT

## 2020-03-10 PROCEDURE — G0283 ELEC STIM OTHER THAN WOUND: HCPCS

## 2020-03-10 PROCEDURE — 97112 NEUROMUSCULAR REEDUCATION: CPT

## 2020-03-10 NOTE — FLOWSHEET NOTE
Outpatient Physical Therapy  [place]           [x] Phone: [phone]   Fax: [phone]  [place]           [] Phone: [phone]   Fax: [phone]        Physical Therapy Daily Treatment Note  Date:  3/10/2020    Patient Name:  Griffin Guillaume    :  1970  MRN: [id number]  Restrictions/Precautions: Other position/activity restrictions: none  Diagnosis:   Diagnosis: Cervical thoracic pain  Date of Injury/Surgery:   Treatment Diagnosis: Treatment Diagnosis: neck and thoracic pain, weakness, stiffness    Insurance/Certification information: PT Insurance Information: [facility]   Referring Physician:  Referring Practitioner: Guillermo Blackwell  Next Doctor Visit:    Plan of care signed (Y/N):  Y, pending cosign PN for 8 more  Outcome Measure: NDI 16%, 3/6: 30%   Visit# / total visits:   10/12 POC  Pain level: 3/10 , 5/10 with driving       Goals:       Short term goals  Extend all goals to 4/3/20  Time Frame for Short term goals: 3 weeks, 20  Short term goal 1: Pt will be able to report at least 25% reduction in pain   Met   Short term goal 2: Pt will be able to tolerate slowly advancing Cerv and thor ROM and strengthening Mostly met  Short term goal 3: Pt will be compliant w/ HEP   Partially met--hard to find time to do them     Long term goals  Time Frame for Long term goals : 6 weeks, 3/13/20  Long term goal 1: Pt will be independent w/ HEP and able to continue to manage her pain on her own after PT   Partially met  Long term goal 2: Pt will be able to report pain < 5/10 at least 50% of the time  Met 75% of time  Long term goal 3: Pt will be able to drive w/ min pain  Partially met, still some pain  NDI 16%, 3/6: 30%     Summary of Evaluation: Assessment: Pt is a 50 yo female who presents w/ neck and upper back pain and R hand symptoms. She demonstrates decreased endurance of cervical and thoracic muscles w/ altered alignment and soft tissue restrictions in thoracic spine especially.   She has

## 2020-03-13 ENCOUNTER — HOSPITAL ENCOUNTER (OUTPATIENT)
Dept: PHYSICAL THERAPY | Age: 50
Setting detail: THERAPIES SERIES
Discharge: HOME | End: 2020-03-13
Payer: COMMERCIAL

## 2020-03-13 PROCEDURE — G0283 ELEC STIM OTHER THAN WOUND: HCPCS

## 2020-03-13 PROCEDURE — 97530 THERAPEUTIC ACTIVITIES: CPT

## 2020-03-13 PROCEDURE — 97110 THERAPEUTIC EXERCISES: CPT

## 2020-03-13 PROCEDURE — 97140 MANUAL THERAPY 1/> REGIONS: CPT

## 2020-03-13 NOTE — FLOWSHEET NOTE
Outpatient Physical Therapy  [place]           [x] Phone: [phone]   Fax: [phone]  [place]           [] Phone: [phone]   Fax: [phone]        Physical Therapy Daily Treatment Note  Date:  3/13/2020    Patient Name:  Miranda Hobbs    :  1970  MRN: [id number]  Restrictions/Precautions: Other position/activity restrictions: none  Diagnosis:   Diagnosis: Cervical thoracic pain  Date of Injury/Surgery:   Treatment Diagnosis: Treatment Diagnosis: neck and thoracic pain, weakness, stiffness    Insurance/Certification information: PT Insurance Information: [address] [place] [address]   Referring Physician:  Referring Practitioner: Helen Aguirre  Next Doctor Visit:    Plan of care signed (Y/N):  Y   Outcome Measure: NDI 16%, 3/6: 30%   Visit# / total visits:    POC  Pain level: 5/10       Goals:       Short term goals  Extend all goals to 4/3/20  Time Frame for Short term goals: 3 weeks, 20  Short term goal 1: Pt will be able to report at least 25% reduction in pain   Met   Short term goal 2: Pt will be able to tolerate slowly advancing Cerv and thor ROM and strengthening Mostly met  Short term goal 3: Pt will be compliant w/ HEP   Partially met--hard to find time to do them     Long term goals  Time Frame for Long term goals : 6 weeks, 3/13/20  Long term goal 1: Pt will be independent w/ HEP and able to continue to manage her pain on her own after PT   Partially met  Long term goal 2: Pt will be able to report pain < 5/10 at least 50% of the time  Met 75% of time  Long term goal 3: Pt will be able to drive w/ min pain  Partially met, still some pain  NDI 16%, 3/6: 30%     Summary of Evaluation: Assessment: Pt is a 50 yo female who presents w/ neck and upper back pain and R hand symptoms. She demonstrates decreased endurance of cervical and thoracic muscles w/ altered alignment and soft tissue restrictions in thoracic spine especially.   She has high tissue irritability R >L thoracid region and

## 2020-03-17 ENCOUNTER — HOSPITAL ENCOUNTER (OUTPATIENT)
Dept: PHYSICAL THERAPY | Age: 50
Setting detail: THERAPIES SERIES
Discharge: HOME | End: 2020-03-17
Payer: COMMERCIAL

## 2020-03-17 PROCEDURE — 97140 MANUAL THERAPY 1/> REGIONS: CPT

## 2020-03-17 PROCEDURE — G0283 ELEC STIM OTHER THAN WOUND: HCPCS

## 2020-03-17 PROCEDURE — 97110 THERAPEUTIC EXERCISES: CPT

## 2021-07-28 ENCOUNTER — HOSPITAL ENCOUNTER (OUTPATIENT)
Age: 51
Discharge: HOME | End: 2021-07-28
Payer: COMMERCIAL

## 2021-07-28 LAB
ALBUMIN SERPL-MCNC: 4.3 GM/DL (ref 3.4–5)
ALP BLD-CCNC: 110 IU/L (ref 40–128)
ALT SERPL-CCNC: 27 U/L (ref 10–40)
ANION GAP SERPL CALCULATED.3IONS-SCNC: 12 MMOL/L (ref 4–16)
AST SERPL-CCNC: 16 IU/L (ref 15–37)
BILIRUB SERPL-MCNC: 0.4 MG/DL (ref 0–1)
BUN BLDV-MCNC: 11 MG/DL (ref 6–23)
CALCIUM SERPL-MCNC: 10.4 MG/DL (ref 8.3–10.6)
CHLORIDE BLD-SCNC: 105 MMOL/L (ref 99–110)
CHOLESTEROL: 352 MG/DL
CO2: 24 MMOL/L (ref 21–32)
CREAT SERPL-MCNC: 0.6 MG/DL (ref 0.6–1.1)
ERYTHROCYTE SEDIMENTATION RATE: 14 MM/HR (ref 0–20)
ESTIMATED AVERAGE GLUCOSE: 103 MG/DL
GFR AFRICAN AMERICAN: >60 ML/MIN/1.73M2
GFR NON-AFRICAN AMERICAN: >60 ML/MIN/1.73M2
GLUCOSE BLD-MCNC: 88 MG/DL (ref 70–99)
HBA1C MFR BLD: 5.2 % (ref 4.2–6.3)
HCT VFR BLD CALC: 43.9 % (ref 37–47)
HDLC SERPL-MCNC: 27 MG/DL
HEMOGLOBIN: 14.6 GM/DL (ref 12.5–16)
LDL CHOLESTEROL DIRECT: 183 MG/DL
MCH RBC QN AUTO: 29.8 PG (ref 27–31)
MCHC RBC AUTO-ENTMCNC: 33.3 % (ref 32–36)
MCV RBC AUTO: 89.6 FL (ref 78–100)
PDW BLD-RTO: 13.2 % (ref 11.7–14.9)
PLATELET # BLD: 381 K/CU MM (ref 140–440)
PMV BLD AUTO: 9.3 FL (ref 7.5–11.1)
POTASSIUM SERPL-SCNC: 4.6 MMOL/L (ref 3.5–5.1)
RBC # BLD: 4.9 M/CU MM (ref 4.2–5.4)
SODIUM BLD-SCNC: 141 MMOL/L (ref 135–145)
T4 FREE: 0.98 NG/DL (ref 0.9–1.8)
TOTAL PROTEIN: 6.9 GM/DL (ref 6.4–8.2)
TRIGL SERPL-MCNC: 846 MG/DL
TSH HIGH SENSITIVITY: 1.6 UIU/ML (ref 0.27–4.2)
URIC ACID: 6.7 MG/DL (ref 2.6–6)
VITAMIN D 25-HYDROXY: 24.18 NG/ML
WBC # BLD: 12.9 K/CU MM (ref 4–10.5)

## 2021-07-28 PROCEDURE — 84443 ASSAY THYROID STIM HORMONE: CPT

## 2021-07-28 PROCEDURE — 84439 ASSAY OF FREE THYROXINE: CPT

## 2021-07-28 PROCEDURE — 85652 RBC SED RATE AUTOMATED: CPT

## 2021-07-28 PROCEDURE — 80061 LIPID PANEL: CPT

## 2021-07-28 PROCEDURE — 80053 COMPREHEN METABOLIC PANEL: CPT

## 2021-07-28 PROCEDURE — 83721 ASSAY OF BLOOD LIPOPROTEIN: CPT

## 2021-07-28 PROCEDURE — 82306 VITAMIN D 25 HYDROXY: CPT

## 2021-07-28 PROCEDURE — 84550 ASSAY OF BLOOD/URIC ACID: CPT

## 2021-07-28 PROCEDURE — 85027 COMPLETE CBC AUTOMATED: CPT

## 2021-07-28 PROCEDURE — 36415 COLL VENOUS BLD VENIPUNCTURE: CPT

## 2021-07-28 PROCEDURE — 83036 HEMOGLOBIN GLYCOSYLATED A1C: CPT

## 2021-09-03 ENCOUNTER — HOSPITAL ENCOUNTER (OUTPATIENT)
Age: 51
Discharge: HOME | End: 2021-09-03
Payer: COMMERCIAL

## 2021-09-03 LAB
ALBUMIN SERPL-MCNC: 4.7 GM/DL (ref 3.4–5)
ALP BLD-CCNC: 111 IU/L (ref 40–129)
ALT SERPL-CCNC: 37 U/L (ref 10–40)
AST SERPL-CCNC: 23 IU/L (ref 15–37)
BILIRUB SERPL-MCNC: 0.2 MG/DL (ref 0–1)
BILIRUBIN DIRECT: 0.2 MG/DL (ref 0–0.3)
BILIRUBIN, INDIRECT: 0 MG/DL (ref 0–0.7)
CHOLESTEROL: 253 MG/DL
HDLC SERPL-MCNC: 35 MG/DL
LDL CHOLESTEROL DIRECT: 138 MG/DL
TOTAL CK: 37 IU/L (ref 26–140)
TOTAL PROTEIN: 7.2 GM/DL (ref 6.4–8.2)
TRIGL SERPL-MCNC: 559 MG/DL

## 2021-09-03 PROCEDURE — 82550 ASSAY OF CK (CPK): CPT

## 2021-09-03 PROCEDURE — 36415 COLL VENOUS BLD VENIPUNCTURE: CPT

## 2021-09-03 PROCEDURE — 80076 HEPATIC FUNCTION PANEL: CPT

## 2021-09-03 PROCEDURE — 80061 LIPID PANEL: CPT

## 2021-09-03 PROCEDURE — 83721 ASSAY OF BLOOD LIPOPROTEIN: CPT

## 2021-10-04 ENCOUNTER — HOSPITAL ENCOUNTER (OUTPATIENT)
Dept: ULTRASOUND IMAGING | Age: 51
Discharge: HOME | End: 2021-10-04
Payer: COMMERCIAL

## 2021-10-04 DIAGNOSIS — Z82.3 FAMILY HISTORY OF STROKE (CEREBROVASCULAR): ICD-10-CM

## 2021-10-04 DIAGNOSIS — E78.5 HYPERLIPIDEMIA, UNSPECIFIED HYPERLIPIDEMIA TYPE: ICD-10-CM

## 2021-10-04 PROCEDURE — 93880 EXTRACRANIAL BILAT STUDY: CPT

## 2021-11-15 ENCOUNTER — HOSPITAL ENCOUNTER (OUTPATIENT)
Dept: WOMENS IMAGING | Age: 51
Discharge: HOME | End: 2021-11-15
Payer: COMMERCIAL

## 2021-11-15 DIAGNOSIS — Z12.31 ENCOUNTER FOR SCREENING MAMMOGRAM FOR MALIGNANT NEOPLASM OF BREAST: ICD-10-CM

## 2021-11-15 PROCEDURE — 77063 BREAST TOMOSYNTHESIS BI: CPT

## 2023-10-27 ENCOUNTER — HOSPITAL ENCOUNTER (OUTPATIENT)
Age: 53
Discharge: HOME | End: 2023-10-27
Payer: COMMERCIAL

## 2023-10-27 LAB
25(OH)D3 SERPL-MCNC: 22.67 NG/ML
ALBUMIN SERPL-MCNC: 4.2 GM/DL (ref 3.4–5)
ALP BLD-CCNC: 106 IU/L (ref 40–129)
ALT SERPL-CCNC: 25 U/L (ref 10–40)
ANION GAP SERPL CALCULATED.3IONS-SCNC: 12 MMOL/L (ref 4–16)
AST SERPL-CCNC: 16 IU/L (ref 15–37)
BACTERIA: NEGATIVE /HPF
BASOPHILS ABSOLUTE: 0.1 K/CU MM
BASOPHILS RELATIVE PERCENT: 0.5 % (ref 0–1)
BILIRUB SERPL-MCNC: 0.3 MG/DL (ref 0–1)
BILIRUBIN DIRECT: 0.2 MG/DL (ref 0–0.3)
BILIRUBIN URINE: NEGATIVE MG/DL
BILIRUBIN, INDIRECT: 0.1 MG/DL (ref 0–0.7)
BLOOD, URINE: ABNORMAL
BUN SERPL-MCNC: 13 MG/DL (ref 6–23)
CALCIUM SERPL-MCNC: 10.4 MG/DL (ref 8.3–10.6)
CHLORIDE BLD-SCNC: 106 MMOL/L (ref 99–110)
CHOLEST SERPL-MCNC: 336 MG/DL
CLARITY: CLEAR
CO2: 22 MMOL/L (ref 21–32)
COLOR: YELLOW
CREAT SERPL-MCNC: 0.6 MG/DL (ref 0.6–1.1)
DIFFERENTIAL TYPE: ABNORMAL
EOSINOPHILS ABSOLUTE: 0.2 K/CU MM
EOSINOPHILS RELATIVE PERCENT: 1.8 % (ref 0–3)
ERYTHROCYTE SEDIMENTATION RATE: 9 MM/HR (ref 0–30)
ESTIMATED AVERAGE GLUCOSE: 97 MG/DL
GFR SERPL CREATININE-BSD FRML MDRD: >60 ML/MIN/1.73M2
GLUCOSE SERPL-MCNC: 95 MG/DL (ref 70–99)
GLUCOSE, URINE: NEGATIVE MG/DL
HBA1C MFR BLD: 5 % (ref 4.2–6.3)
HCT VFR BLD CALC: 46.2 % (ref 37–47)
HDLC SERPL-MCNC: 31 MG/DL
HEMOGLOBIN: 14.8 GM/DL (ref 12.5–16)
IMMATURE NEUTROPHIL %: 0.4 % (ref 0–0.43)
KETONES, URINE: NEGATIVE MG/DL
LDLC SERPL CALC-MCNC: ABNORMAL MG/DL
LDLC SERPL-MCNC: 181 MG/DL
LEUKOCYTE ESTERASE, URINE: NEGATIVE
LYMPHOCYTES ABSOLUTE: 4.4 K/CU MM
LYMPHOCYTES RELATIVE PERCENT: 34.8 % (ref 24–44)
MAGNESIUM: 2.1 MG/DL (ref 1.8–2.4)
MCH RBC QN AUTO: 28.9 PG (ref 27–31)
MCHC RBC AUTO-ENTMCNC: 32 % (ref 32–36)
MCV RBC AUTO: 90.2 FL (ref 78–100)
MONOCYTES ABSOLUTE: 0.8 K/CU MM
MONOCYTES RELATIVE PERCENT: 6.1 % (ref 0–4)
NITRITE URINE, QUANTITATIVE: NEGATIVE
NUCLEATED RBC %: 0 %
PDW BLD-RTO: 13.5 % (ref 11.7–14.9)
PH, URINE: 5 (ref 5–8)
PLATELET # BLD: 390 K/CU MM (ref 140–440)
PMV BLD AUTO: 9.6 FL (ref 7.5–11.1)
POTASSIUM SERPL-SCNC: 4.5 MMOL/L (ref 3.5–5.1)
PROTEIN UA: NEGATIVE MG/DL
RBC # BLD: 5.12 M/CU MM (ref 4.2–5.4)
RBC URINE: 2 /HPF (ref 0–6)
SEGMENTED NEUTROPHILS ABSOLUTE COUNT: 7.1 K/CU MM
SEGMENTED NEUTROPHILS RELATIVE PERCENT: 56.4 % (ref 36–66)
SODIUM BLD-SCNC: 140 MMOL/L (ref 135–145)
SPECIFIC GRAVITY UA: >1.03 (ref 1–1.03)
SQUAMOUS EPITHELIAL: 5 /HPF
T4 FREE SERPL-MCNC: 0.92 NG/DL (ref 0.9–1.8)
TOTAL IMMATURE NEUTOROPHIL: 0.05 K/CU MM
TOTAL NUCLEATED RBC: 0 K/CU MM
TOTAL PROTEIN: 7.1 GM/DL (ref 6.4–8.2)
TRICHOMONAS: NORMAL /HPF
TRIGL SERPL-MCNC: 783 MG/DL
TSH SERPL DL<=0.005 MIU/L-ACNC: 2.28 UIU/ML (ref 0.27–4.2)
URATE SERPL-MCNC: 5.7 MG/DL (ref 2.6–6)
UROBILINOGEN, URINE: 0.2 MG/DL (ref 0.2–1)
WBC # BLD: 12.6 K/CU MM (ref 4–10.5)
WBC UA: 1 /HPF (ref 0–5)

## 2023-10-27 PROCEDURE — 84550 ASSAY OF BLOOD/URIC ACID: CPT

## 2023-10-27 PROCEDURE — 82306 VITAMIN D 25 HYDROXY: CPT

## 2023-10-27 PROCEDURE — 83735 ASSAY OF MAGNESIUM: CPT

## 2023-10-27 PROCEDURE — 85025 COMPLETE CBC W/AUTO DIFF WBC: CPT

## 2023-10-27 PROCEDURE — 81001 URINALYSIS AUTO W/SCOPE: CPT

## 2023-10-27 PROCEDURE — 82248 BILIRUBIN DIRECT: CPT

## 2023-10-27 PROCEDURE — 83036 HEMOGLOBIN GLYCOSYLATED A1C: CPT

## 2023-10-27 PROCEDURE — 80053 COMPREHEN METABOLIC PANEL: CPT

## 2023-10-27 PROCEDURE — 36415 COLL VENOUS BLD VENIPUNCTURE: CPT

## 2023-10-27 PROCEDURE — 84439 ASSAY OF FREE THYROXINE: CPT

## 2023-10-27 PROCEDURE — 83721 ASSAY OF BLOOD LIPOPROTEIN: CPT

## 2023-10-27 PROCEDURE — 84443 ASSAY THYROID STIM HORMONE: CPT

## 2023-10-27 PROCEDURE — 85652 RBC SED RATE AUTOMATED: CPT

## 2023-10-27 PROCEDURE — 80061 LIPID PANEL: CPT

## 2025-01-29 ENCOUNTER — TRANSCRIBE ORDERS (OUTPATIENT)
Dept: ADMINISTRATIVE | Age: 55
End: 2025-01-29

## 2025-01-29 DIAGNOSIS — Z12.31 ENCOUNTER FOR SCREENING MAMMOGRAM FOR MALIGNANT NEOPLASM OF BREAST: Primary | ICD-10-CM

## 2025-05-22 ENCOUNTER — HOSPITAL ENCOUNTER (EMERGENCY)
Age: 55
Discharge: HOME | End: 2025-05-22
Attending: STUDENT IN AN ORGANIZED HEALTH CARE EDUCATION/TRAINING PROGRAM
Payer: COMMERCIAL

## 2025-05-22 VITALS
SYSTOLIC BLOOD PRESSURE: 150 MMHG | HEART RATE: 82 BPM | BODY MASS INDEX: 28.93 KG/M2 | HEIGHT: 66 IN | WEIGHT: 180 LBS | OXYGEN SATURATION: 98 % | DIASTOLIC BLOOD PRESSURE: 84 MMHG | RESPIRATION RATE: 20 BRPM | TEMPERATURE: 97.9 F

## 2025-05-22 DIAGNOSIS — M43.6 TORTICOLLIS: Primary | ICD-10-CM

## 2025-05-22 PROCEDURE — 6360000002 HC RX W HCPCS: Performed by: STUDENT IN AN ORGANIZED HEALTH CARE EDUCATION/TRAINING PROGRAM

## 2025-05-22 PROCEDURE — 6370000000 HC RX 637 (ALT 250 FOR IP): Performed by: STUDENT IN AN ORGANIZED HEALTH CARE EDUCATION/TRAINING PROGRAM

## 2025-05-22 PROCEDURE — 99284 EMERGENCY DEPT VISIT MOD MDM: CPT

## 2025-05-22 PROCEDURE — 96372 THER/PROPH/DIAG INJ SC/IM: CPT

## 2025-05-22 RX ORDER — CYCLOBENZAPRINE HCL 10 MG
10 TABLET ORAL ONCE
Status: COMPLETED | OUTPATIENT
Start: 2025-05-22 | End: 2025-05-22

## 2025-05-22 RX ORDER — KETOROLAC TROMETHAMINE 15 MG/ML
30 INJECTION, SOLUTION INTRAMUSCULAR; INTRAVENOUS ONCE
Status: COMPLETED | OUTPATIENT
Start: 2025-05-22 | End: 2025-05-22

## 2025-05-22 RX ORDER — KETOROLAC TROMETHAMINE 10 MG/1
10 TABLET, FILM COATED ORAL EVERY 6 HOURS PRN
Qty: 20 TABLET | Refills: 0 | Status: SHIPPED | OUTPATIENT
Start: 2025-05-22 | End: 2026-05-22

## 2025-05-22 RX ORDER — CYCLOBENZAPRINE HCL 10 MG
10 TABLET ORAL 3 TIMES DAILY PRN
Qty: 21 TABLET | Refills: 0 | Status: SHIPPED | OUTPATIENT
Start: 2025-05-22 | End: 2025-06-01

## 2025-05-22 RX ADMIN — CYCLOBENZAPRINE 10 MG: 10 TABLET, FILM COATED ORAL at 06:57

## 2025-05-22 RX ADMIN — KETOROLAC TROMETHAMINE 30 MG: 15 INJECTION, SOLUTION INTRAMUSCULAR; INTRAVENOUS at 06:57

## 2025-05-22 ASSESSMENT — PAIN SCALES - GENERAL
PAINLEVEL_OUTOF10: 7
PAINLEVEL_OUTOF10: 7

## 2025-05-22 ASSESSMENT — PAIN - FUNCTIONAL ASSESSMENT: PAIN_FUNCTIONAL_ASSESSMENT: 0-10

## 2025-05-22 NOTE — ED PROVIDER NOTES
Emergency Department Encounter    Patient: Pilar Varma  MRN: [id number]  : 1970  Date of Evaluation: 2025  ED Provider:  Travis Leon DO    Triage Chief Complaint:   No chief complaint on file.    [place]:  Pilar Varma is a 54 y.o. female that presents with several weeks of right-sided neck pain that over the last 1 week has gotten worse.  States dull pains on the right side of her neck abdomen, groin.  Worse with certain movements.  Recently had a cloxacillin which she did a lot of heavy lifting.  Symptoms have gotten worse after that.  No falls or trauma.  No lightheadedness or syncope no fevers.  No numbness or weakness.  Has tried Zanaflex at home without relief.    MDM:    History from : Patient    On arrival patient was mildly hypertensive otherwise stable vital signs.  She appears well is awake alert providing full history no concerning neurologic deficits.  Muscular tenderness and increased muscular spasticity appreciated on the right side at the sternocleidomastoid.  She does not report significant pain at the area of the sternocleidomastoid muscle insertion on the base of the skull.  She has had no trauma associated with this.  I do not think any further diagnostic imaging would be helpful at this time.    Will treat her symptomatically and also discussed other supportive care measures at home such as stretching that can further help alleviate her symptoms.  Low suspicion for meningitis.  Will treat her with Toradol and Flexeril and send these medications at home as well.  Recommend recheck with PCP.  Standard return precautions given.           Patient was given the following medications:  Medications   ketorolac (TORADOL) injection 30 mg (has no administration in time range)   cyclobenzaprine (FLEXERIL) tablet 10 mg (has no administration in time range)       Discharge condition: stable    I am the Primary Clinician of Record.    Is this patient to be included in the SEP- Core  Medications   Medication Sig Dispense Refill    ketorolac (TORADOL) 10 MG tablet Take 1 tablet by mouth every 6 hours as needed for Pain 20 tablet 0    cyclobenzaprine (FLEXERIL) 10 MG tablet Take 1 tablet by mouth 3 times daily as needed for Muscle spasms 21 tablet 0    ondansetron (ZOFRAN) 4 MG tablet Take 1 tablet by mouth every 8 hours as needed for Nausea 10 tablet 0    HYDROmorphone (DILAUDID) 2 MG tablet Take 2 mg by mouth every 4 hours as needed for Pain      ondansetron (ZOFRAN ODT) 4 MG disintegrating tablet Take 1 tablet by mouth every 8 hours as needed for Nausea. 20 tablet 0     Allergies   Allergen Reactions    Codeine        Physical Exam:  Triage VS:      ED Triage Vitals   Encounter Vitals Group      BP 05/22/25 0556 (!) 148/83      Systolic BP Percentile --       Diastolic BP Percentile --       Pulse 05/22/25 0556 82      Respirations 05/22/25 0556 18      Temp 05/22/25 0556 97.8 °F (36.6 °C)      Temp Source 05/22/25 0556 Oral      SpO2 05/22/25 0556 98 %      Weight - Scale 05/22/25 0601 81.6 kg (180 lb)      Height 05/22/25 0601 1.676 m (5' 6\")      Head Circumference --       Peak Flow --       Pain Score --       Pain Loc --       Pain Education --       Exclude from Growth Chart --           Physical Exam  Vitals and nursing note reviewed.   Constitutional:       General: She is not in acute distress.  HENT:      Head: Normocephalic and atraumatic.      Nose: Nose normal.      Mouth/Throat:      Mouth: Mucous membranes are moist.   Cardiovascular:      Rate and Rhythm: Normal rate.      Pulses:           Radial pulses are 2+ on the right side and 2+ on the left side.   Musculoskeletal:      Comments: Muscular spasticity of the right sternocleidomastoid muscle and right-sided trapezius muscles.  No midline C-spine tenderness.  No edema of the neck no erythema in the neck.  No nuchal rigidity.   Neurological:      Mental Status: She is alert and oriented to person, place, and time.